# Patient Record
Sex: FEMALE | Race: BLACK OR AFRICAN AMERICAN | NOT HISPANIC OR LATINO | URBAN - METROPOLITAN AREA
[De-identification: names, ages, dates, MRNs, and addresses within clinical notes are randomized per-mention and may not be internally consistent; named-entity substitution may affect disease eponyms.]

---

## 2017-04-12 ENCOUNTER — EMERGENCY (EMERGENCY)
Facility: HOSPITAL | Age: 27
LOS: 1 days | Discharge: HOME | End: 2017-04-12
Attending: EMERGENCY MEDICINE | Admitting: EMERGENCY MEDICINE
Payer: MEDICAID

## 2017-04-12 VITALS
HEART RATE: 93 BPM | HEIGHT: 63 IN | DIASTOLIC BLOOD PRESSURE: 83 MMHG | RESPIRATION RATE: 16 BRPM | SYSTOLIC BLOOD PRESSURE: 132 MMHG | TEMPERATURE: 97 F | WEIGHT: 169.98 LBS | OXYGEN SATURATION: 100 %

## 2017-04-12 VITALS
TEMPERATURE: 98 F | OXYGEN SATURATION: 96 % | SYSTOLIC BLOOD PRESSURE: 139 MMHG | RESPIRATION RATE: 18 BRPM | HEART RATE: 90 BPM | DIASTOLIC BLOOD PRESSURE: 79 MMHG

## 2017-04-12 DIAGNOSIS — Z88.1 ALLERGY STATUS TO OTHER ANTIBIOTIC AGENTS STATUS: ICD-10-CM

## 2017-04-12 DIAGNOSIS — J45.909 UNSPECIFIED ASTHMA, UNCOMPLICATED: ICD-10-CM

## 2017-04-12 DIAGNOSIS — Z88.8 ALLERGY STATUS TO OTHER DRUGS, MEDICAMENTS AND BIOLOGICAL SUBSTANCES STATUS: ICD-10-CM

## 2017-04-12 DIAGNOSIS — J39.2 OTHER DISEASES OF PHARYNX: ICD-10-CM

## 2017-04-12 DIAGNOSIS — T78.1XXA OTHER ADVERSE FOOD REACTIONS, NOT ELSEWHERE CLASSIFIED, INITIAL ENCOUNTER: ICD-10-CM

## 2017-04-12 PROCEDURE — 99283 EMERGENCY DEPT VISIT LOW MDM: CPT

## 2017-04-12 RX ORDER — DIPHENHYDRAMINE HCL 50 MG
1 CAPSULE ORAL
Qty: 15 | Refills: 0 | OUTPATIENT
Start: 2017-04-12 | End: 2017-04-17

## 2017-04-12 NOTE — ED PROVIDER NOTE - PROGRESS NOTE DETAILS
The scribe's documentation has been prepared under my direction and personally reviewed by me in its entirety. I confirm that the note above accurately reflects all work, treatment, procedures, and medical decision making performed by me.  -UTE Kulkarni patient observed for allergic rxn, patient feeling better, no hives, no wheezing or airway involvement in ED, will d/c home with prednisone, benadryl, f/u PMD, return precautions reviewed.

## 2017-04-12 NOTE — ED PROVIDER NOTE - OBJECTIVE STATEMENT
27 y/o F with pmhx asthma and multiple food allergies including peanuts coming in after eating almonds this morning at 8:30. States her throat immediately felt itchy and was wheezing so drank half of a bottle of children's Benadryl. Pt was evaluated at an urgent care where she received albuterol treatment and steroids. Reports symptoms much improved. Reports h/o allergic reaction in the past requiring epi-pen. No n/v, throat swelling, rash. 25 y/o F with pmhx asthma and multiple food allergies including peanuts coming in after eating almonds this morning at 8:30am. States her throat immediately felt itchy and was wheezing so drank half of a bottle of children's Benadryl. Pt was evaluated at an urgent care where she received albuterol treatment and decadron 10mg IM. Reports symptoms much improved. Reports h/o allergic reaction in the past requiring epi-pen. No n/v, throat swelling, rash.

## 2017-04-12 NOTE — ED PROVIDER NOTE - NS ED MD SCRIBE ATTENDING SCRIBE SECTIONS
PROGRESS NOTE/PAST MEDICAL/SURGICAL/SOCIAL HISTORY/HIV/VITAL SIGNS( Pullset)/PHYSICAL EXAM/REVIEW OF SYSTEMS/HISTORY OF PRESENT ILLNESS

## 2017-04-12 NOTE — ED ADULT TRIAGE NOTE - CHIEF COMPLAINT QUOTE
Pt ate almonds and felt sudden onset SOB and wheezing. She drank almost a full bottle of liquid children's benadryl and then went to [facility]. They gave her decadron 10 mg IM and an albuterol treatment which relieved her symptoms. They sent her here.

## 2017-04-12 NOTE — ED ADULT NURSE NOTE - OBJECTIVE STATEMENT
3/4 of childrens benedryl   burning in throat   SOB  decadron at [facility] md, sent for monitoring   albuterol  has needed epi in the past Pt was [facility] in [facility] for [facility] reaction. Pt reports having almonds while at work and immediately felt burning sensation in throat and SOB. Pt reports drinking 3/4 bottle of childrens benadryl with no relief then going [facility]. Pt was given albuterol treatment and 10mg decadron [facility]. Pt arrived to ED in [facility], reports feeling better and denies any trouble breathing. Pt has no hx of allergy to almonds, multiple other food allergies, has used epi pen in the past. Pt had no hives or edema when arriving to ED. Pt denies any N/V/D.

## 2017-04-12 NOTE — ED PROVIDER NOTE - MEDICAL DECISION MAKING DETAILS
allergic rxn, now well appearing, nontoxic, stable vitals, no hives or airway involvement, took benadryl and steroids prior to ED arrival, will observe.

## 2017-04-12 NOTE — ED ADULT NURSE NOTE - CHIEF COMPLAINT QUOTE
Pt ate almonds and felt sudden onset SOB and wheezing. She drank almost a full bottle of liquid children's benadryl and then went to [facility]. They gave her decadron 10 mg IM and an albuterol treatment which relieved her symptoms. They sent her here.

## 2020-10-27 ENCOUNTER — HOSPITAL ENCOUNTER (EMERGENCY)
Facility: HOSPITAL | Age: 30
Discharge: HOME | End: 2020-10-27
Attending: EMERGENCY MEDICINE | Admitting: EMERGENCY MEDICINE

## 2020-10-27 ENCOUNTER — APPOINTMENT (EMERGENCY)
Dept: RADIOLOGY | Facility: HOSPITAL | Age: 30
End: 2020-10-27

## 2020-10-27 VITALS
TEMPERATURE: 97.6 F | HEART RATE: 76 BPM | DIASTOLIC BLOOD PRESSURE: 59 MMHG | SYSTOLIC BLOOD PRESSURE: 122 MMHG | RESPIRATION RATE: 18 BRPM | OXYGEN SATURATION: 100 %

## 2020-10-27 DIAGNOSIS — M79.605 LEFT LEG PAIN: Primary | ICD-10-CM

## 2020-10-27 PROCEDURE — 99283 EMERGENCY DEPT VISIT LOW MDM: CPT

## 2020-10-27 PROCEDURE — 73590 X-RAY EXAM OF LOWER LEG: CPT

## 2020-10-27 PROCEDURE — 99284 EMERGENCY DEPT VISIT MOD MDM: CPT | Performed by: EMERGENCY MEDICINE

## 2020-10-27 RX ORDER — IBUPROFEN 600 MG/1
600 TABLET ORAL ONCE
Status: COMPLETED | OUTPATIENT
Start: 2020-10-27 | End: 2020-10-27

## 2020-10-27 RX ORDER — ACETAMINOPHEN 325 MG/1
975 TABLET ORAL EVERY 6 HOURS PRN
Qty: 60 TABLET | Refills: 0 | Status: SHIPPED | OUTPATIENT
Start: 2020-10-27

## 2020-10-27 RX ORDER — IBUPROFEN 600 MG/1
600 TABLET ORAL EVERY 6 HOURS PRN
Qty: 30 TABLET | Refills: 0 | Status: SHIPPED | OUTPATIENT
Start: 2020-10-27

## 2020-10-27 RX ORDER — ACETAMINOPHEN 325 MG/1
975 TABLET ORAL ONCE
Status: COMPLETED | OUTPATIENT
Start: 2020-10-27 | End: 2020-10-27

## 2020-10-27 RX ADMIN — ACETAMINOPHEN 975 MG: 325 TABLET ORAL at 00:48

## 2020-10-27 RX ADMIN — IBUPROFEN 600 MG: 600 TABLET, FILM COATED ORAL at 00:48

## 2022-05-04 ENCOUNTER — APPOINTMENT (EMERGENCY)
Dept: ULTRASOUND IMAGING | Facility: HOSPITAL | Age: 32
End: 2022-05-04
Payer: COMMERCIAL

## 2022-05-04 ENCOUNTER — HOSPITAL ENCOUNTER (EMERGENCY)
Facility: HOSPITAL | Age: 32
End: 2022-05-05
Attending: EMERGENCY MEDICINE | Admitting: EMERGENCY MEDICINE
Payer: COMMERCIAL

## 2022-05-04 DIAGNOSIS — N93.9 VAGINAL BLEEDING: ICD-10-CM

## 2022-05-04 DIAGNOSIS — T19.3XXA FOREIGN BODY OF UTERUS, INITIAL ENCOUNTER: Primary | ICD-10-CM

## 2022-05-04 LAB
ABO GROUP BLD: NORMAL
ALBUMIN SERPL BCP-MCNC: 4.3 G/DL (ref 3–5.2)
ALP SERPL-CCNC: 89 U/L (ref 43–122)
ALT SERPL W P-5'-P-CCNC: 18 U/L
ANION GAP SERPL CALCULATED.3IONS-SCNC: 6 MMOL/L (ref 5–14)
APTT PPP: 29 SECONDS (ref 23–37)
AST SERPL W P-5'-P-CCNC: 24 U/L (ref 14–36)
BACTERIA UR QL AUTO: ABNORMAL /HPF
BASOPHILS # BLD AUTO: 0.06 THOUSANDS/ΜL (ref 0–0.1)
BASOPHILS NFR BLD AUTO: 1 % (ref 0–1)
BILIRUB SERPL-MCNC: 0.39 MG/DL
BILIRUB UR QL STRIP: NEGATIVE
BLD GP AB SCN SERPL QL: NEGATIVE
BUN SERPL-MCNC: 19 MG/DL (ref 5–25)
CALCIUM SERPL-MCNC: 8.6 MG/DL (ref 8.4–10.2)
CHLORIDE SERPL-SCNC: 107 MMOL/L (ref 97–108)
CLARITY UR: ABNORMAL
CO2 SERPL-SCNC: 29 MMOL/L (ref 22–30)
COLOR UR: ABNORMAL
CREAT SERPL-MCNC: 0.72 MG/DL (ref 0.6–1.2)
EOSINOPHIL # BLD AUTO: 1.04 THOUSAND/ΜL (ref 0–0.61)
EOSINOPHIL NFR BLD AUTO: 8 % (ref 0–6)
ERYTHROCYTE [DISTWIDTH] IN BLOOD BY AUTOMATED COUNT: 16.6 % (ref 11.6–15.1)
EXT PREG TEST URINE: NEGATIVE
EXT. CONTROL ED NAV: NORMAL
GFR SERPL CREATININE-BSD FRML MDRD: 112 ML/MIN/1.73SQ M
GLUCOSE SERPL-MCNC: 98 MG/DL (ref 70–99)
GLUCOSE UR STRIP-MCNC: NEGATIVE MG/DL
HCT VFR BLD AUTO: 34.6 % (ref 34.8–46.1)
HGB BLD-MCNC: 10 G/DL (ref 11.5–15.4)
HGB UR QL STRIP.AUTO: 250
IMM GRANULOCYTES # BLD AUTO: 0.03 THOUSAND/UL (ref 0–0.2)
IMM GRANULOCYTES NFR BLD AUTO: 0 % (ref 0–2)
INR PPP: 1.12 (ref 0.84–1.19)
KETONES UR STRIP-MCNC: NEGATIVE MG/DL
LEUKOCYTE ESTERASE UR QL STRIP: 25
LYMPHOCYTES # BLD AUTO: 4.35 THOUSANDS/ΜL (ref 0.6–4.47)
LYMPHOCYTES NFR BLD AUTO: 35 % (ref 14–44)
MAGNESIUM SERPL-MCNC: 1.8 MG/DL (ref 1.6–2.3)
MCH RBC QN AUTO: 22.1 PG (ref 26.8–34.3)
MCHC RBC AUTO-ENTMCNC: 28.9 G/DL (ref 31.4–37.4)
MCV RBC AUTO: 77 FL (ref 82–98)
MONOCYTES # BLD AUTO: 0.71 THOUSAND/ΜL (ref 0.17–1.22)
MONOCYTES NFR BLD AUTO: 6 % (ref 4–12)
MUCOUS THREADS UR QL AUTO: ABNORMAL
NEUTROPHILS # BLD AUTO: 6.23 THOUSANDS/ΜL (ref 1.85–7.62)
NEUTS SEG NFR BLD AUTO: 50 % (ref 43–75)
NITRITE UR QL STRIP: NEGATIVE
NON-SQ EPI CELLS URNS QL MICRO: ABNORMAL /HPF
NRBC BLD AUTO-RTO: 0 /100 WBCS
PH UR STRIP.AUTO: 6.5 [PH]
PLATELET # BLD AUTO: 433 THOUSANDS/UL (ref 149–390)
PMV BLD AUTO: 10 FL (ref 8.9–12.7)
POTASSIUM SERPL-SCNC: 3.6 MMOL/L (ref 3.6–5)
PROT SERPL-MCNC: 8 G/DL (ref 5.9–8.4)
PROT UR STRIP-MCNC: ABNORMAL MG/DL
PROTHROMBIN TIME: 13.9 SECONDS (ref 11.6–14.5)
RBC # BLD AUTO: 4.52 MILLION/UL (ref 3.81–5.12)
RBC #/AREA URNS AUTO: ABNORMAL /HPF
RH BLD: POSITIVE
SODIUM SERPL-SCNC: 142 MMOL/L (ref 137–147)
SP GR UR STRIP.AUTO: 1.01 (ref 1–1.04)
SPECIMEN EXPIRATION DATE: NORMAL
UROBILINOGEN UA: 4 MG/DL
WBC # BLD AUTO: 12.42 THOUSAND/UL (ref 4.31–10.16)
WBC #/AREA URNS AUTO: ABNORMAL /HPF

## 2022-05-04 PROCEDURE — 96361 HYDRATE IV INFUSION ADD-ON: CPT

## 2022-05-04 PROCEDURE — 86900 BLOOD TYPING SEROLOGIC ABO: CPT | Performed by: EMERGENCY MEDICINE

## 2022-05-04 PROCEDURE — 36415 COLL VENOUS BLD VENIPUNCTURE: CPT | Performed by: EMERGENCY MEDICINE

## 2022-05-04 PROCEDURE — 85610 PROTHROMBIN TIME: CPT | Performed by: EMERGENCY MEDICINE

## 2022-05-04 PROCEDURE — 96374 THER/PROPH/DIAG INJ IV PUSH: CPT

## 2022-05-04 PROCEDURE — 99285 EMERGENCY DEPT VISIT HI MDM: CPT

## 2022-05-04 PROCEDURE — 81025 URINE PREGNANCY TEST: CPT | Performed by: EMERGENCY MEDICINE

## 2022-05-04 PROCEDURE — 85025 COMPLETE CBC W/AUTO DIFF WBC: CPT | Performed by: EMERGENCY MEDICINE

## 2022-05-04 PROCEDURE — 81001 URINALYSIS AUTO W/SCOPE: CPT | Performed by: EMERGENCY MEDICINE

## 2022-05-04 PROCEDURE — 86901 BLOOD TYPING SEROLOGIC RH(D): CPT | Performed by: EMERGENCY MEDICINE

## 2022-05-04 PROCEDURE — 80053 COMPREHEN METABOLIC PANEL: CPT | Performed by: EMERGENCY MEDICINE

## 2022-05-04 PROCEDURE — 99285 EMERGENCY DEPT VISIT HI MDM: CPT | Performed by: EMERGENCY MEDICINE

## 2022-05-04 PROCEDURE — 83735 ASSAY OF MAGNESIUM: CPT | Performed by: EMERGENCY MEDICINE

## 2022-05-04 PROCEDURE — 86850 RBC ANTIBODY SCREEN: CPT | Performed by: EMERGENCY MEDICINE

## 2022-05-04 PROCEDURE — 76856 US EXAM PELVIC COMPLETE: CPT

## 2022-05-04 PROCEDURE — 85730 THROMBOPLASTIN TIME PARTIAL: CPT | Performed by: EMERGENCY MEDICINE

## 2022-05-04 PROCEDURE — 76830 TRANSVAGINAL US NON-OB: CPT

## 2022-05-04 RX ORDER — KETOROLAC TROMETHAMINE 30 MG/ML
30 INJECTION, SOLUTION INTRAMUSCULAR; INTRAVENOUS ONCE
Status: COMPLETED | OUTPATIENT
Start: 2022-05-04 | End: 2022-05-04

## 2022-05-04 RX ADMIN — KETOROLAC TROMETHAMINE 30 MG: 30 INJECTION, SOLUTION INTRAMUSCULAR; INTRAVENOUS at 21:26

## 2022-05-04 RX ADMIN — SODIUM CHLORIDE 1000 ML: 0.9 INJECTION, SOLUTION INTRAVENOUS at 20:31

## 2022-05-05 ENCOUNTER — HOSPITAL ENCOUNTER (EMERGENCY)
Facility: HOSPITAL | Age: 32
Discharge: HOME | End: 2022-05-05
Admitting: EMERGENCY MEDICINE
Payer: COMMERCIAL

## 2022-05-05 VITALS
OXYGEN SATURATION: 100 % | DIASTOLIC BLOOD PRESSURE: 69 MMHG | SYSTOLIC BLOOD PRESSURE: 111 MMHG | HEART RATE: 66 BPM | RESPIRATION RATE: 18 BRPM | TEMPERATURE: 98.4 F

## 2022-05-05 VITALS
SYSTOLIC BLOOD PRESSURE: 128 MMHG | HEART RATE: 81 BPM | DIASTOLIC BLOOD PRESSURE: 88 MMHG | TEMPERATURE: 98.4 F | RESPIRATION RATE: 16 BRPM | WEIGHT: 236.55 LBS | OXYGEN SATURATION: 100 %

## 2022-05-05 DIAGNOSIS — N93.9 ABNORMAL UTERINE BLEEDING (AUB): Primary | ICD-10-CM

## 2022-05-05 PROBLEM — I63.9 STROKE OF UNKNOWN ETIOLOGY (HCC): Status: ACTIVE | Noted: 2022-05-05

## 2022-05-05 PROCEDURE — 99204 OFFICE O/P NEW MOD 45 MIN: CPT | Performed by: OBSTETRICS & GYNECOLOGY

## 2022-05-05 PROCEDURE — 87591 N.GONORRHOEAE DNA AMP PROB: CPT | Performed by: OBSTETRICS & GYNECOLOGY

## 2022-05-05 PROCEDURE — 87491 CHLMYD TRACH DNA AMP PROBE: CPT | Performed by: OBSTETRICS & GYNECOLOGY

## 2022-05-05 PROCEDURE — 99284 EMERGENCY DEPT VISIT MOD MDM: CPT

## 2022-05-05 NOTE — ASSESSMENT & PLAN NOTE
No indication for acute management at this time, bleeding controlled on speculum exam  GC/CT collected and pending, will contact patient if abnormal  Outpatient follow-up scheduled with [facility] on Monday 5/9/22  Will prescribe Aygestin for symptomatic treatment of bleeding, taper explained to patient with initial dose starting at 4 times per day, rest of management per her OBGYN

## 2022-05-05 NOTE — ASSESSMENT & PLAN NOTE
Patient described episode of paralysis and right greater than left-sided weakness after a stroke when she was 20  No documentation in her chart about this portion of her history, would not recommend any estrogen treatment for birth control

## 2022-05-05 NOTE — ED PROVIDER NOTES
History  Chief Complaint   Patient presents with    Vaginal Bleeding     Pt states that she had her IUD removed about 1 month ago, and she states she had a 3-4 day period shortly after  She started her last period on the 21st of April, and has been bleeding heavily since then  Reports fist-sized clots on Friday, says clots are better today, but doctor told her to come in      Headache     Pt states she has had a migraine for the past 4 days  Took tylenol yesterday  Patient is a 61-year-old female coming in today complaining of heavy vaginal bleeding  Patient states that approximately 1 and half months ago she had her IUD removed  She had this removed in the [place] is she states that is where her IUD was placed  She reports that immediately after she had some spotting and bleeding that was after the IUD removal for several days  This subsequently resolved patient reports that she was told by her gynecologist that she was going to start the NuvaRing after her 1st   She states 13 days ago approximately she started with bleeding  Over the past several days there been heavier clotting  She has been going through pads and tampons she  She had no difficulty with urination  She does have a headache for the past several days and well  She has been taking Tylenol with no relief  Patient has had no abnormal Pap smears or STDs  She does report today she went through 5-6 pads and tampons         History provided by:  Patient   used: No    Vaginal Bleeding  Quality:  Bright red, clots and dark red  Severity:  Moderate  Onset quality:  Gradual  Timing:  Constant  Progression:  Worsening  Chronicity:  New  Possible pregnancy: no    Context: spontaneously    Context: not after intercourse, not after urination, not at rest, not during intercourse, not during urination, not foreign body and not genital trauma    Relieved by:  None tried  Worsened by:  Nothing  Ineffective treatments:  None tried  Associated symptoms: no abdominal pain, no back pain, no dizziness, no dyspareunia, no dysuria, no fatigue, no fever, no nausea and no vaginal discharge    Risk factors: prior miscarriage    Risk factors: no bleeding disorder, no hx of ectopic pregnancy, no hx of endometriosis, no gynecological surgery, does not have multiple partners, no new sexual partner, no ovarian cysts, no ovarian torsion, no PID, no STD, no STD exposure, no terminated pregnancies and does not have unprotected sex    Headache  Associated symptoms: no abdominal pain, no back pain, no cough, no dizziness, no ear pain, no eye pain, no fatigue, no fever, no nausea, no seizures, no sore throat and no vomiting        Prior to Admission Medications   Prescriptions Last Dose Informant Patient Reported? Taking?   acetaminophen (TYLENOL) 325 mg tablet Not Taking at Unknown time  No No   Sig: Take 3 tablets (975 mg total) by mouth every 6 (six) hours as needed for mild pain   Patient not taking: Reported on 2022    diclofenac sodium (VOLTAREN) 1 % Not Taking at Unknown time  No No   Sig: Apply 2 g topically 4 (four) times a day   Patient not taking: Reported on 2022    ibuprofen (MOTRIN) 600 mg tablet Not Taking at Unknown time  No No   Sig: Take 1 tablet (600 mg total) by mouth every 6 (six) hours as needed for mild pain   Patient not taking: Reported on 2022       Facility-Administered Medications: None       Past Medical History:   Diagnosis Date    Asthma        Past Surgical History:   Procedure Laterality Date     SECTION  2020       History reviewed  No pertinent family history  I have reviewed and agree with the history as documented      E-Cigarette/Vaping     E-Cigarette/Vaping Substances     Social History     Tobacco Use    Smoking status: Never Smoker    Smokeless tobacco: Never Used   Substance Use Topics    Alcohol use: Yes     Comment: socially    Drug use: Never       Review of Systems Constitutional: Negative  Negative for chills, fatigue and fever  HENT: Negative  Negative for ear pain and sore throat  Eyes: Negative  Negative for pain and visual disturbance  Respiratory: Negative  Negative for cough and shortness of breath  Cardiovascular: Negative  Negative for chest pain and palpitations  Gastrointestinal: Negative  Negative for abdominal pain, nausea and vomiting  Genitourinary: Positive for vaginal bleeding  Negative for dyspareunia, dysuria, hematuria and vaginal discharge  Musculoskeletal: Negative  Negative for arthralgias and back pain  Skin: Negative  Negative for color change and rash  Neurological: Positive for headaches  Negative for dizziness, seizures and syncope  Hematological: Negative  Psychiatric/Behavioral: Negative  All other systems reviewed and are negative  Physical Exam  Physical Exam  Vitals and nursing note reviewed  Exam conducted with a chaperone present  Constitutional:       General: She is not in acute distress  Appearance: She is well-developed  HENT:      Head: Normocephalic and atraumatic  Comments: Patient maintaining airway and secretions  No stridor   No brawniness under tongue  Mouth/Throat:      Mouth: Mucous membranes are moist    Eyes:      Extraocular Movements: Extraocular movements intact  Conjunctiva/sclera: Conjunctivae normal       Pupils: Pupils are equal, round, and reactive to light  Cardiovascular:      Rate and Rhythm: Normal rate and regular rhythm  Heart sounds: No murmur heard  Pulmonary:      Effort: Pulmonary effort is normal  No respiratory distress  Breath sounds: Normal breath sounds  Abdominal:      Palpations: Abdomen is soft  There is no mass  Tenderness: There is no abdominal tenderness  There is no rebound  Genitourinary:     General: Normal vulva  Exam position: Knee-chest position        Vagina: Normal       Cervix: Cervical bleeding present  Musculoskeletal:         General: Normal range of motion  Cervical back: Neck supple  Skin:     General: Skin is warm and dry  Capillary Refill: Capillary refill takes less than 2 seconds  Neurological:      General: No focal deficit present  Mental Status: She is alert and oriented to person, place, and time  GCS: GCS eye subscore is 4  GCS verbal subscore is 5  GCS motor subscore is 6  Cranial Nerves: Cranial nerves are intact  Sensory: Sensation is intact  Motor: Motor function is intact  Coordination: Coordination is intact  Gait: Gait is intact  Psychiatric:         Attention and Perception: Attention normal          Mood and Affect: Mood normal          Behavior: Behavior normal          Thought Content:  Thought content normal          Judgment: Judgment normal          Vital Signs  ED Triage Vitals   Temperature Pulse Respirations Blood Pressure SpO2   05/04/22 2007 05/04/22 2007 05/04/22 2007 05/04/22 2007 05/04/22 2007   (!) 97 2 °F (36 2 °C) 81 16 126/66 100 %      Temp Source Heart Rate Source Patient Position - Orthostatic VS BP Location FiO2 (%)   05/04/22 2007 05/04/22 2007 05/04/22 2007 05/04/22 2007 --   Tympanic Monitor Sitting Left arm       Pain Score       05/04/22 2240       No Pain           Vitals:    05/04/22 2007 05/04/22 2234   BP: 126/66 123/86   Pulse: 81 79   Patient Position - Orthostatic VS: Sitting          Visual Acuity      ED Medications  Medications   sodium chloride 0 9 % bolus 1,000 mL (1,000 mL Intravenous New Bag 5/4/22 2031)   ketorolac (TORADOL) injection 30 mg (30 mg Intravenous Given 5/4/22 2126)       Diagnostic Studies  Results Reviewed     Procedure Component Value Units Date/Time    Urine Microscopic [[id number]]  (Abnormal) Collected: 05/04/22 2024    Lab Status: Final result Specimen: Urine, Clean Catch Updated: 05/04/22 2118     RBC, UA Innumerable /hpf      WBC, UA 1-2 /hpf      Epithelial Cells Occasional /hpf      Bacteria, UA Occasional /hpf      MUCUS THREADS Occasional    UA (URINE) with reflex to Scope [[id number]]  (Abnormal) Collected: 05/04/22 2024    Lab Status: Final result Specimen: Urine, Clean Catch Updated: 05/04/22 2111     Color, UA Red     Clarity, UA Bloody     Specific Gravity, UA 1 015     pH, UA 6 5     Leukocytes, UA 25 0     Nitrite, UA Negative     Protein, UA 30 (1+) mg/dl      Glucose, UA Negative mg/dl      Ketones, UA Negative mg/dl      Bilirubin, UA Negative     Blood,  0     UROBILINOGEN UA 4 0 mg/dL     Magnesium [[id number]]  (Normal) Collected: 05/04/22 2028    Lab Status: Final result Specimen: Blood from Arm, Right Updated: 05/04/22 2059     Magnesium 1 8 mg/dL     Comprehensive metabolic panel [[id number]] Collected: 05/04/22 2028    Lab Status: Final result Specimen: Blood from Arm, Right Updated: 05/04/22 2059     Sodium 142 mmol/L      Potassium 3 6 mmol/L      Chloride 107 mmol/L      CO2 29 mmol/L      ANION GAP 6 mmol/L      BUN 19 mg/dL      Creatinine 0 72 mg/dL      Glucose 98 mg/dL      Calcium 8 6 mg/dL      AST 24 U/L      ALT 18 U/L      Alkaline Phosphatase 89 U/L      Total Protein 8 0 g/dL      Albumin 4 3 g/dL      Total Bilirubin 0 39 mg/dL      eGFR 112 ml/min/1 73sq m     Narrative:      [place] guidelines for Chronic Kidney Disease (CKD):     Stage 1 with normal or high GFR (GFR > 90 mL/min/1 73 square meters)    Stage 2 Mild CKD (GFR = 60-89 mL/min/1 73 square meters)    Stage 3A Moderate CKD (GFR = 45-59 mL/min/1 73 square meters)    Stage 3B Moderate CKD (GFR = 30-44 mL/min/1 73 square meters)    Stage 4 Severe CKD (GFR = 15-29 mL/min/1 73 square meters)    Stage 5 End Stage CKD (GFR <15 mL/min/1 73 square meters)  Note: GFR calculation is accurate only with a steady state creatinine    Protime-INR [[id number]]  (Normal) Collected: 05/04/22 2028    Lab Status: Final result Specimen: Blood from Arm, Right Updated: 05/04/22 2044     Protime 13 9 seconds      INR 1 12    APTT [[id number]]  (Normal) Collected: 05/04/22 2028    Lab Status: Final result Specimen: Blood from Arm, Right Updated: 05/04/22 2044     PTT 29 seconds     POCT pregnancy, urine [[id number]]  (Normal) Resulted: 05/04/22 2035    Lab Status: Final result Updated: 05/04/22 2035     EXT PREG TEST UR (Ref: Negative) negative     Control valid    CBC and differential [[id number]]  (Abnormal) Collected: 05/04/22 2028    Lab Status: Final result Specimen: Blood from Arm, Right Updated: 05/04/22 2033     WBC 12 42 Thousand/uL      RBC 4 52 Million/uL      Hemoglobin 10 0 g/dL      Hematocrit 34 6 %      MCV 77 fL      MCH 22 1 pg      MCHC 28 9 g/dL      RDW 16 6 %      MPV 10 0 fL      Platelets 379 Thousands/uL      nRBC 0 /100 WBCs      Neutrophils Relative 50 %      Immat GRANS % 0 %      Lymphocytes Relative 35 %      Monocytes Relative 6 %      Eosinophils Relative 8 %      Basophils Relative 1 %      Neutrophils Absolute 6 23 Thousands/µL      Immature Grans Absolute 0 03 Thousand/uL      Lymphocytes Absolute 4 35 Thousands/µL      Monocytes Absolute 0 71 Thousand/µL      Eosinophils Absolute 1 04 Thousand/µL      Basophils Absolute 0 06 Thousands/µL                  US pelvis complete w transvaginal   Final Result by Adrien Paul MD (05/04 2146)       4 mm linear hyperechoic lesion is seen in the endometrial stripe (series 1 (168) image 97, labeled TRANS UTERUS CX-FUND) concerning for foreign body  I personally discussed this study with Francisco Officer on 5/4/2022 at 9:46 PM                         Workstation performed: [id number]                    Procedures  Procedures         ED Course  ED Course as of 05/04/22 2318   Wed May 04, 2022   2023 Patient is a 81-year-old female coming in today with heavy vaginal bleeding ongoing for the past 13 days  On exam she is well-appearing in no distress  Hemodynamically stable    Discussed with patient regarding labs and ultrasound  Will tried to review chart as patient had her IUD removed [facility] the [facility]    Portions of the record may have been created with voice recognition software  Occasional wrong word or "sound a like" substitutions may have occurred due to the inherent limitations of voice recognition software  Read the chart carefully and recognize, using context, where substitutions have occurred  2046 Unable to access medical record   2048 Patient's hemoglobin 10  No old to compare   2145 Received call from Radiologist that the images are concerning for FB in the endometrium     2200 Reaching out to Dr Dallas Lorenzo for further evaluation  2216 Pelvic exam does show bleeding from the cervix and clots  Patient reports that the IUD was removed smoothly per patient report from [facility] visualize reports  Patient's hemoglobin is 10  She is unaware of prior hemoglobin  Placed packs transfer for ER to ER transfer for private patient for further evaluation to [facility]   2217 Discussed with Dr Dallas Lorenzo and I discussed with patient  Will transfer  Patient updated and agreeable  Initial discussion was for TXA  Patient has no history of PE or DVT but does have a personal history at 24 of a stroke  Patient states that she did not receive tPA  But she reports that she needed rehab  Will not give TXA at this time     2317  time is that 1:15 a m     Verified that I do not need to make phone call to [facility] ER as this is a private patient for the ER                               SBIRT 22yo+      Most Recent Value   SBIRT (23 yo +)    In order to provide better care to our patients, we are screening all of our patients for alcohol and drug use  Would it be okay to ask you these screening questions?  No Filed at: 05/04/2022 2127                    [facility]  Number of Diagnoses or Management Options  Diagnosis management comments:     DDx including but not limited to: ectopic pregnancy, threatened , missed , incomplete , anemia, coagulopathy, DUB, tumor, retained products of conception, polycystic disease  Amount and/or Complexity of Data Reviewed  Clinical lab tests: ordered and reviewed  Tests in the radiology section of CPT®: reviewed and ordered  Tests in the medicine section of CPT®: ordered and reviewed  Review and summarize past medical records: yes  Independent visualization of images, tracings, or specimens: yes        Disposition  Final diagnoses:   Vaginal bleeding   Foreign body of uterus, initial encounter     Time reflects when diagnosis was documented in both MDM as applicable and the Disposition within this note     Time User Action Codes Description Comment    2022  8:26 PM Oval Bees Add [N93 9] Vaginal bleeding     2022  8:29 PM Regenia Pair Modify [N93 9] Vaginal bleeding     2022  9:46 PM Greenberg Mouse Modify [N93 9] Vaginal bleeding     2022 10:24 PM Regenia Pair Modify [N93 9] Vaginal bleeding     2022 10:24 PM Mirian Cleveland U  76  [id number]] Foreign body of uterus, initial encounter       ED Disposition     ED Disposition Condition Date/Time Comment    Transfer to Another Facility-In Network  Wed May 4, 2022 10:24 PM [address] Jimenes should be transferred out to [facility]  Follow-up Information    None         Patient's Medications   Discharge Prescriptions    No medications on file       No discharge procedures on file      PDMP Review     None          ED Provider  Electronically Signed by           Pham Box DO  22 [phone]

## 2022-05-05 NOTE — PROGRESS NOTES
Consultation - Gynecology  Ronaldo Juárez 28 y o  female MRN: [id number]  Unit/Bed#: ED 23 Encounter: [id number]          Chief Complaint   Patient presents with    Vaginal Bleeding     Pt reports getting IUD removed 1 month ago  She reports heavy bleeding for the past 14 days  She reports cramping and a mirgraine as well  Assessment/Plan      * Abnormal uterine bleeding (AUB)  Assessment & Plan  No indication for acute management at this time, bleeding controlled on speculum exam  GC/CT collected and pending, will contact patient if abnormal  Outpatient follow-up scheduled with [facility] on Monday 5/9/22  Will prescribe Aygestin for symptomatic treatment of bleeding, taper explained to patient with initial dose starting at 4 times per day, rest of management per her OBGYN    Stroke of unknown etiology [facility])  Assessment & Plan  Patient described episode of paralysis and right greater than left-sided weakness after a stroke when she was 20  No documentation in her chart about this portion of her history, would not recommend any estrogen treatment for birth control      Patient examined and discussed with Dr Bang Louise    History of Present Illness:  Ronaldo Juárez is a 28 y o  No obstetric history on file  female who presents with heavy vaginal bleeding  Patient states that approximately 1 and half months ago she had her ParaGard IUD removed  She had this removed in the [place] where it was placed  She reports that immediately after she had some spotting and bleeding for several days  Bleeding had stopped until 2 weeks ago when it resumed and has been persistent  Over the past several days there been heavier clotting  She has been going through pads and tampons  All other review of symptoms are negative  Patient explains that the IUD was in for approximately 1 5 years and near the end of this time it felt to her like her body did not like it so she had the ParaGard removed    There was no difficulty with removal   Also reports that her 2nd child was delivered via  which was performed due to a failed induction per patient  Historical Information   Past Medical History:   Diagnosis Date    Asthma      Past Surgical History:   Procedure Laterality Date     SECTION  2020     OB History   No obstetric history on file  No family history on file  Social History   Social History     Substance and Sexual Activity   Alcohol Use Yes    Comment: socially     Social History     Substance and Sexual Activity   Drug Use Never     Social History     Tobacco Use   Smoking Status Never Smoker   Smokeless Tobacco Never Used     Allergies   Allergen Reactions    Clindamycin Other (See Comments)     "Redness of skin, SOB, hives"    Kiwi Extract - Food Allergy     Peanut Oil - Food Allergy     Soy Allergy - Food Allergy     Verapamil Other (See Comments)     "Redness of skin"    Wheat Bran - Food Allergy        Objective   Vitals: Blood pressure 111/69, pulse 66, temperature 98 4 °F (36 9 °C), temperature source Oral, resp  rate 18, last menstrual period 2022, SpO2 100 %  There is no height or weight on file to calculate BMI  Invasive Devices  Report    None                 Physical Exam  Vitals reviewed  Exam conducted with a chaperone present  Constitutional:       Appearance: Normal appearance  Eyes:      Extraocular Movements: Extraocular movements intact  Conjunctiva/sclera: Conjunctivae normal       Pupils: Pupils are equal, round, and reactive to light  Cardiovascular:      Rate and Rhythm: Normal rate and regular rhythm  Pulmonary:      Effort: Pulmonary effort is normal  No respiratory distress  Genitourinary:     General: Normal vulva  Vagina: Normal  No signs of injury  Cervix: Cervical bleeding present  No cervical motion tenderness  Uterus: Normal  Not enlarged and not tender         Adnexa: Right adnexa normal and left adnexa normal  Comments: Dark red blood noted in vaginal vault, no active bleeding, no clots present, no lesions or foreign bodies palpated on bimanual exam, uterus nonenlarged  Neurological:      General: No focal deficit present  Mental Status: She is alert and oriented to person, place, and time           Lab Results:   Recent Results (from the past 24 hour(s))   UA (URINE) with reflex to Scope    Collection Time: 05/04/22  8:24 PM   Result Value Ref Range    Color, UA Red (A) Straw, Yellow, Pale Yellow    Clarity, UA Bloody (A) Clear, Other    Specific Gravity, UA 1 015 1 003 - 1 040    pH, UA 6 5 4 5, 5 0, 5 5, 6 0, 6 5, 7 0, 7 5, 8 0    Leukocytes, UA 25 0 (A) Negative    Nitrite, UA Negative Negative    Protein, UA 30 (1+) (A) Negative mg/dl    Glucose, UA Negative Negative mg/dl    Ketones, UA Negative Negative mg/dl    Bilirubin, UA Negative Negative    Blood,  0 (A) Negative    UROBILINOGEN UA 4 0 (A) 1 0, Negative mg/dL   Urine Microscopic    Collection Time: 05/04/22  8:24 PM   Result Value Ref Range    RBC, UA Innumerable (A) None Seen, 0-1, 1-2, 2-4, 0-5 /hpf    WBC, UA 1-2 None Seen, 0-1, 1-2, 0-5, 2-4 /hpf    Epithelial Cells Occasional None Seen, Occasional /hpf    Bacteria, UA Occasional None Seen, Occasional /hpf    MUCUS THREADS Occasional (A) None Seen   CBC and differential    Collection Time: 05/04/22  8:28 PM   Result Value Ref Range    WBC 12 42 (H) 4 31 - 10 16 Thousand/uL    RBC 4 52 3 81 - 5 12 Million/uL    Hemoglobin 10 0 (L) 11 5 - 15 4 g/dL    Hematocrit 34 6 (L) 34 8 - 46 1 %    MCV 77 (L) 82 - 98 fL    MCH 22 1 (L) 26 8 - 34 3 pg    MCHC 28 9 (L) 31 4 - 37 4 g/dL    RDW 16 6 (H) 11 6 - 15 1 %    MPV 10 0 8 9 - 12 7 fL    Platelets 226 (H) 649 - 390 Thousands/uL    nRBC 0 /100 WBCs    Neutrophils Relative 50 43 - 75 %    Immat GRANS % 0 0 - 2 %    Lymphocytes Relative 35 14 - 44 %    Monocytes Relative 6 4 - 12 %    Eosinophils Relative 8 (H) 0 - 6 %    Basophils Relative 1 0 - 1 % Neutrophils Absolute 6 23 1 85 - 7 62 Thousands/µL    Immature Grans Absolute 0 03 0 00 - 0 20 Thousand/uL    Lymphocytes Absolute 4 35 0 60 - 4 47 Thousands/µL    Monocytes Absolute 0 71 0 17 - 1 22 Thousand/µL    Eosinophils Absolute 1 04 (H) 0 00 - 0 61 Thousand/µL    Basophils Absolute 0 06 0 00 - 0 10 Thousands/µL   Protime-INR    Collection Time: 05/04/22  8:28 PM   Result Value Ref Range    Protime 13 9 11 6 - 14 5 seconds    INR 1 12 0 84 - 1 19   APTT    Collection Time: 05/04/22  8:28 PM   Result Value Ref Range    PTT 29 23 - 37 seconds   Comprehensive metabolic panel    Collection Time: 05/04/22  8:28 PM   Result Value Ref Range    Sodium 142 137 - 147 mmol/L    Potassium 3 6 3 6 - 5 0 mmol/L    Chloride 107 97 - 108 mmol/L    CO2 29 22 - 30 mmol/L    ANION GAP 6 5 - 14 mmol/L    BUN 19 5 - 25 mg/dL    Creatinine 0 72 0 60 - 1 20 mg/dL    Glucose 98 70 - 99 mg/dL    Calcium 8 6 8 4 - 10 2 mg/dL    AST 24 14 - 36 U/L    ALT 18 <35 U/L    Alkaline Phosphatase 89 43 - 122 U/L    Total Protein 8 0 5 9 - 8 4 g/dL    Albumin 4 3 3 0 - 5 2 g/dL    Total Bilirubin 0 39 <1 30 mg/dL    eGFR 112 ml/min/1 73sq m   Type and screen    Collection Time: 05/04/22  8:28 PM   Result Value Ref Range    ABO Grouping A     Rh Factor Positive     Antibody Screen Negative     Specimen Expiration Date 20220507    Magnesium    Collection Time: 05/04/22  8:28 PM   Result Value Ref Range    Magnesium 1 8 1 6 - 2 3 mg/dL   POCT pregnancy, urine    Collection Time: 05/04/22  8:35 PM   Result Value Ref Range    EXT PREG TEST UR (Ref: Negative) negative     Control valid        Audi Nugent MD  5/5/2022  7:39 AM

## 2022-05-05 NOTE — EMTALA/ACUTE CARE TRANSFER
[facility] EMERGENCY DEPARTMENT  [facility] [address]  [phone]  Dept: [phone]      EMTALA TRANSFER CONSENT    NAME Salomon RABAGO 1990                              MRN [id number]    I have been informed of my rights regarding examination, treatment, and transfer   by Dr Olimpia Jansen DO    Benefits:      Risks:        Consent for Transfer:  I acknowledge that my medical condition has been evaluated and explained to me by the emergency department physician or other qualified medical person and/or my attending physician, who has recommended that I be transferred to the service of Dr Trinh Pepe    at [facility]    The above potential benefits of such transfer, the potential risks associated with such transfer, and the probable risks of not being transferred have been explained to me, and I fully understand them  The doctor has explained that, in my case, the benefits of transfer outweigh the risks  I agree to be transferred  I authorize the performance of emergency medical procedures and treatments upon me in both transit and upon arrival at the receiving facility  Additionally, I authorize the release of any and all medical records to the receiving facility and request they be transported with me, if possible  I understand that the safest mode of transportation during a medical emergency is an ambulance and that the Hospital advocates the use of this mode of transport  Risks of traveling to the receiving facility by car, including absence of medical control, life sustaining equipment, such as oxygen, and medical personnel has been explained to me and I fully understand them  (IVORY CORRECT BOX BELOW)  [  ]  I consent to the stated transfer and to be transported by ambulance/helicopter    [  ]  I consent to the stated transfer, but refuse transportation by ambulance and accept full responsibility for my transportation by car  I understand the risks of non-ambulance transfers and I exonerate the Hospital and its staff from any deterioration in my condition that results from this refusal     X___________________________________________    DATE  22  TIME________  Signature of patient or legally responsible individual signing on patient behalf           RELATIONSHIP TO PATIENT_________________________          Provider Certification    NAME Salomon Rao                                         1990                              MRN [id number]    A medical screening exam was performed on the above named patient  Based on the examination:    Condition Necessitating Transfer The primary encounter diagnosis was Foreign body of uterus, initial encounter  A diagnosis of Vaginal bleeding was also pertinent to this visit  Patient Condition:      Reason for Transfer:      Transfer Requirements: Facility     · Space available and qualified personnel available for treatment as acknowledged by    · Agreed to accept transfer and to provide appropriate medical treatment as acknowledged by          · Appropriate medical records of the examination and treatment of the patient are provided at the time of transfer   [address] [facility], [address] _______  · Transfer will be performed by qualified personnel from    and appropriate transfer equipment as required, including the use of necessary and appropriate life support measures      Provider Certification: I have examined the patient and explained the following risks and benefits of being transferred/refusing transfer to the patient/family:         Based on these reasonable risks and benefits to the patient and/or the unborn child(marisela), and based upon the information available at the time of the patients examination, I certify that the medical benefits reasonably to be expected from the provision of appropriate medical treatments at another medical facility outweigh the increasing risks, if any, to the individuals medical condition, and in the case of labor to the unborn child, from effecting the transfer      X____________________________________________ DATE 05/04/22        TIME_______      ORIGINAL - SEND TO MEDICAL RECORDS   COPY - SEND WITH PATIENT DURING TRANSFER

## 2022-05-06 LAB
C TRACH DNA SPEC QL NAA+PROBE: NEGATIVE
N GONORRHOEA DNA SPEC QL NAA+PROBE: NEGATIVE

## 2022-09-27 ENCOUNTER — HOSPITAL ENCOUNTER (EMERGENCY)
Facility: HOSPITAL | Age: 32
Discharge: HOME | End: 2022-09-27
Attending: EMERGENCY MEDICINE
Payer: COMMERCIAL

## 2022-09-27 ENCOUNTER — APPOINTMENT (OUTPATIENT)
Dept: RADIOLOGY | Facility: HOSPITAL | Age: 32
End: 2022-09-27
Payer: COMMERCIAL

## 2022-09-27 VITALS
HEART RATE: 89 BPM | TEMPERATURE: 98.1 F | DIASTOLIC BLOOD PRESSURE: 67 MMHG | SYSTOLIC BLOOD PRESSURE: 118 MMHG | OXYGEN SATURATION: 100 % | RESPIRATION RATE: 18 BRPM

## 2022-09-27 DIAGNOSIS — M25.511 RIGHT SHOULDER PAIN: ICD-10-CM
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
## 2022-09-27 PROCEDURE — 99284 EMERGENCY DEPT VISIT MOD MDM: CPT

## 2022-09-27 PROCEDURE — 73030 X-RAY EXAM OF SHOULDER: CPT

## 2022-09-27 PROCEDURE — 71046 X-RAY EXAM CHEST 2 VIEWS: CPT

## 2022-09-27 RX ORDER — LIDOCAINE 40 MG/G
CREAM TOPICAL AS NEEDED
Qty: 30 G | Refills: 0 | Status: SHIPPED | OUTPATIENT
Start: 2022-09-27

## 2022-09-27 RX ORDER — LIDOCAINE 50 MG/G
1 PATCH TOPICAL ONCE
Status: DISCONTINUED | OUTPATIENT
Start: 2022-09-27 | End: 2022-09-28 | Stop reason: HOSPADM

## 2022-09-27 RX ORDER — METHOCARBAMOL 500 MG/1
500 TABLET, FILM COATED ORAL 2 TIMES DAILY
Qty: 20 TABLET | Refills: 0 | Status: SHIPPED | OUTPATIENT
Start: 2022-09-27

## 2022-09-27 RX ORDER — ACETAMINOPHEN 500 MG
500 TABLET ORAL EVERY 6 HOURS PRN
Qty: 30 TABLET | Refills: 0 | Status: SHIPPED | OUTPATIENT
Start: 2022-09-27

## 2022-09-27 RX ADMIN — LIDOCAINE 1 PATCH: 50 PATCH CUTANEOUS at 22:22

## 2022-09-28 NOTE — DISCHARGE INSTRUCTIONS
You may take Tylenol 500 to 1000 mg three times a day as needed  Do not exceed 3000 mg of tylenol a day as this can cause liver damage    - Only take the muscle relaxant when you are going to be home or are going to bed  It may make you drowsy   Do not operate heavy machinery, drive or work while on it    - Use the lidocaine cream as prescribed    Return for severe pain, trouble breathing, numbness/tingling, weakness, chest pain, or new/concerning symptoms    If shoulder pain persists follow up with Orthopedist
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
